# Patient Record
Sex: FEMALE | Race: WHITE
[De-identification: names, ages, dates, MRNs, and addresses within clinical notes are randomized per-mention and may not be internally consistent; named-entity substitution may affect disease eponyms.]

---

## 2018-01-19 ENCOUNTER — HOSPITAL ENCOUNTER (EMERGENCY)
Dept: HOSPITAL 62 - ER | Age: 23
Discharge: HOME | End: 2018-01-19
Payer: COMMERCIAL

## 2018-01-19 VITALS — SYSTOLIC BLOOD PRESSURE: 113 MMHG | DIASTOLIC BLOOD PRESSURE: 58 MMHG

## 2018-01-19 DIAGNOSIS — Z3A.11: ICD-10-CM

## 2018-01-19 DIAGNOSIS — M54.6: ICD-10-CM

## 2018-01-19 DIAGNOSIS — O99.89: ICD-10-CM

## 2018-01-19 DIAGNOSIS — R31.9: ICD-10-CM

## 2018-01-19 DIAGNOSIS — O23.41: Primary | ICD-10-CM

## 2018-01-19 DIAGNOSIS — R07.1: ICD-10-CM

## 2018-01-19 DIAGNOSIS — M54.5: ICD-10-CM

## 2018-01-19 DIAGNOSIS — O26.891: ICD-10-CM

## 2018-01-19 LAB
APPEARANCE UR: (no result)
APTT PPP: YELLOW S
BILIRUB UR QL STRIP: NEGATIVE
GLUCOSE UR STRIP-MCNC: NEGATIVE MG/DL
KETONES UR STRIP-MCNC: (no result) MG/DL
NITRITE UR QL STRIP: NEGATIVE
PH UR STRIP: 5 [PH] (ref 5–9)
PROT UR STRIP-MCNC: NEGATIVE MG/DL
SP GR UR STRIP: 1.03
UROBILINOGEN UR-MCNC: 2 MG/DL (ref ?–2)

## 2018-01-19 PROCEDURE — 81001 URINALYSIS AUTO W/SCOPE: CPT

## 2018-01-19 PROCEDURE — 99283 EMERGENCY DEPT VISIT LOW MDM: CPT

## 2018-01-19 PROCEDURE — 87086 URINE CULTURE/COLONY COUNT: CPT

## 2018-01-19 NOTE — ER DOCUMENT REPORT
ED Neck/Back Problem





- General


Chief Complaint: Back Pain


Stated Complaint: BACK PAIN


Time Seen by Provider: 01/19/18 20:34


Notes: 





22-year-old female patient 11 weeks pregnant complaining of diffuse back pain.  

Pain is located in her thoracic and lumbar spine.  Paraspinally.  Denies any 

trauma.  Denies any fever, chills, sweats.  States that the pain in the middle 

of her back sometimes radiates up into her touch has wall at the back.  Denies 

any shortness of breath.  Denies any leg pain.  No prior history of blood 

clots.  Does not smoke.  No family history of DVT or PE.





- HPI


Patient complains to provider of: Pain


Onset: Last week


Timing: Waxing and waning





- Related Data


Allergies/Adverse Reactions: 


 





No Known Allergies Allergy (Unverified 01/19/18 19:07)


 











Past Medical History





- General


Information source: Patient





- Social History


Smoking Status: Never Smoker


Cigarette use (# per day): No


Frequency of alcohol use: None


Drug Abuse: None


Lives with: Spouse/Significant other


Family History: None





- Past Medical History


Cardiac Medical History: Reports: None


Pulmonary Medical History: Reports: None


Neurological Medical History: Reports: None


Endocrine Medical History: Reports: None


Renal/ Medical History: Reports: None


Malignancy Medical History: Reports: None


GI Medical History: Reports: None


Musculoskeltal Medical History: Reports None





Review of Systems





- Review of Systems


Constitutional: denies: Fever, Malaise, Weakness


Cardiovascular: denies: Chest pain, Palpitations, Heart racing, Dyspnea, Syncope

, Dizziness


Respiratory: Hurts to breathe.  denies: Cough, Short of breath, Wheezing


Gastrointestinal: denies: Abdominal pain, Diarrhea, Nausea, Vomiting


Genitourinary: denies: Burning, Dysuria, Discharge, Flank pain, Hematuria


Female Genitourinary: Pregnant.  denies: Vaginal discharge, Vaginal bleeding


Musculoskeletal: Back pain, Muscle pain, Muscle stiffness


Skin: No symptoms reported


Neurological/Psychological: No symptoms reported





Physical Exam





- Vital signs


Vitals: 


 











Temp Pulse Resp BP Pulse Ox


 


 98.3 F   90   18   119/65   100 


 


 01/19/18 19:43  01/19/18 19:43  01/19/18 19:43  01/19/18 19:43  01/19/18 19:43











Interpretation: Normal





- General


General appearance: Appears well, Alert





- HEENT


Head: Normocephalic, Atraumatic


Eyes: Normal


Pupils: PERRL





- Respiratory


Respiratory status: No respiratory distress


Chest status: Nontender


Breath sounds: Normal


Chest palpation: Normal





- Cardiovascular


Rhythm: Regular


Heart sounds: Normal auscultation


Murmur: No





- Abdominal


Inspection: Normal


Distension: No distension


Bowel sounds: Normal


Tenderness: Nontender


Organomegaly: No organomegaly





- Back


Back: Normal, Tender.  No: Vertebra tenderness


Notes: 





She does have some mild paraspinal tenderness mid thoracic spine all the way 

down to the lumbar paraspinal muscles bilaterally.  No point tenderness of the 

vertebral processes.  No obvious step-offs.





- Extremities


General upper extremity: Normal inspection, Nontender, Normal color, Normal ROM

, Normal temperature


General lower extremity: Normal inspection, Nontender, Normal color, Normal ROM

, Normal temperature, Normal weight bearing.  No: Moisés's sign





- Neurological


Neuro grossly intact: Yes


Cognition: Normal


Orientation: AAOx4


Jenna Coma Scale Eye Opening: Spontaneous


[place] Coma Scale Verbal: Oriented


Jenna Coma Scale Motor: Obeys Commands


[place] Coma Scale Total: 15


Speech: Normal


Motor strength normal: LUE, RUE, LLE, RLE


Sensory: Normal





- Psychological


Associated symptoms: Normal affect, Normal mood





- Skin


Skin Temperature: Warm


Skin Moisture: Dry


Skin Color: Normal





Course





- Re-evaluation


Re-evalutation: 





01/19/18 20:38


Is a well-appearing 22-year-old female with no past medical history who is 11 

weeks pregnant.  Complaining of some back pain.  Advised patient that due to 

the fact that she is in her first trimester she should avoid taking any thing 

other than Tylenol.  Do not feel compelled at this time to order any x-rays 

which would expose the fetus to radiation.  Explained this to the patient that 

I do not feel patient needs x-rays at this time and she seems to comprehend 

this logic.  Will give her some Tylenol, check her urine.  Advised her to follow

-up with her regular doctor.


01/19/18 21:07


Urinalysis shows possible UTI.  In the setting of pregnancy will elect to treat 

at this time with Macrobid.  Patient advised to take all the medications as 

prescribed.





- Vital Signs


Vital signs: 


 











Temp Pulse Resp BP Pulse Ox


 


 98.3 F   90   18   119/65   100 


 


 01/19/18 19:43  01/19/18 19:43  01/19/18 19:43  01/19/18 19:43  01/19/18 19:43














- Laboratory


Laboratory results interpreted by me: 


 











  01/19/18





  20:34


 


Urine Ketones  TRACE H


 


Urine Urobilinogen  2.0 H


 


Ur Leukocyte Esterase  MODERATE H


 


Urine Ascorbic Acid  40 H














Discharge





- Discharge


Clinical Impression: 


Back pain


Qualifiers:


 Back pain location: low back pain Chronicity: acute Back pain laterality: 

bilateral Sciatica presence: without sciatica Qualified Code(s): M54.5 - Low 

back pain





Urinary tract infection


Qualifiers:


 Urinary tract infection type: site unspecified Hematuria presence: without 

hematuria Qualified Code(s): N39.0 - Urinary tract infection, site not specified





Condition: Good


Disposition: HOME, SELF-CARE


Instructions:  Low Back Pain ([facility]), Urinary Tract Infection ([facility]), 

Nitrofurantoin ([facility])


Prescriptions: 


Nitrofurantoin/Nitrofuran Mac [Macrobid 100 mg Capsule] 1 tab PO BID #20 capsule


Forms:  Return to Work

## 2018-03-16 ENCOUNTER — HOSPITAL ENCOUNTER (EMERGENCY)
Dept: HOSPITAL 62 - ER | Age: 23
LOS: 1 days | Discharge: HOME | End: 2018-03-17
Payer: COMMERCIAL

## 2018-03-16 DIAGNOSIS — O46.92: Primary | ICD-10-CM

## 2018-03-16 DIAGNOSIS — Z3A.18: ICD-10-CM

## 2018-03-16 PROCEDURE — 99283 EMERGENCY DEPT VISIT LOW MDM: CPT

## 2018-03-16 NOTE — ER DOCUMENT REPORT
ED General





- General


Chief Complaint: Vaginal Bleeding


Stated Complaint: VAGINAL BLEEDING


Time Seen by Provider: 03/16/18 23:40


Notes: 


Patient is a 22-year-old female presents with complaint of some vaginal 

spotting and pregnancy.  She is approximately 2 weeks pregnant.  This is her 

first pregnancy.  She is on prenatal vitamins.  She does not smoke or drink or 

do drugs.  She says her blood type is A+.  Patient says that she was helping 

her  do some work around the house.  She went to use the bathroom and 

when she wiped she noticed very small amount of blood.  She has had no further 

bleeding.  No abdominal pain.  No abnormal vaginal discharge.  No dysuria.  No 

fevers.  No other complaints at this time.  She has had multiple ultrasounds 

during her pregnancy which have shown an IUP.  [facility].





TRAVEL OUTSIDE OF THE [place] IN LAST 30 DAYS: No





- Related Data


Allergies/Adverse Reactions: 


 





No Known Allergies Allergy (Unverified 01/19/18 19:07)


 











Past Medical History





- Social History


Smoking Status: Never Smoker


Frequency of alcohol use: None


Drug Abuse: None


Family History: None


Renal/ Medical History: Denies: Hx Peritoneal Dialysis





Review of Systems





- Review of Systems


Notes: 





My Normal Review Basic





REVIEW OF SYSTEMS:


CONSTITUTIONAL :  Denies fever,  chills, or sweats.  Denies recent illness.


RESPIRATORY:  Denies cough, cold, or chest congestion.  Denies shortness of 

breath, difficulty breathing, or wheezing.


GASTROINTESTINAL:  Denies abdominal pain.  Denies nausea, vomiting, or 

diarrhea.  Denies constipation.  Last BM: 


GENITOURINARY:  Denies difficulty urinating, painful urination, burning, 

frequency, or blood in urine.


FEMALE  GENITOURINARY: Some vaginal spotting.  LMP: 18 weeks pregnant


MUSCULOSKELETAL:  Denies neck or back pain or joint pain or swelling.


SKIN:   Denies rash or skin lesions.


NEUROLOGICAL:  Denies altered mental status or loss of consciousness.  Denies 

headache.  Denies weakness or paralysis or loss of use of either side.  Denies 

problems with gait or speech.  Denies sensory or motor loss.


ALL OTHER SYSTEMS REVIEWED AND NEGATIVE.





Physical Exam





- Vital signs


Vitals: 


 











Temp Pulse Resp BP Pulse Ox


 


 98.4 F   99   16   115/63   99 


 


 03/16/18 21:23  03/16/18 21:23  03/16/18 21:23  03/16/18 21:23  03/16/18 21:23














- Notes


Notes: 





General Appearance: Well nourished, alert, cooperative, no acute distress, no 

obvious discomfort.  Well-appearing.


Vitals: reviewed, See vital signs table.


Eyes: PERRL, EOMI, Conjuctiva clearmegaly


Lungs: No wheezing, No rales, No rhonci, No accessory muscle use, good air 

exchange bilaterally.


Heart: Normal rate, Regular rythm, No murmur, no rub


Abdomen: Normal BS, soft, No rigidity, No abdominal tenderness, No guarding, no 

rebound, bedside ultrasound shows intrauterine pregnancy with a heart rate of 

164 bpm.  Good fetal movement.


Extremities: strength 5/5 in all extremities, good pulses in all extremities, 

no swelling or tenderness in the extremities, no edema.


Skin: warm, dry, appropriate color, no rash


Neuro: speech clear, oriented x 3, normal affect, responds appropriately to 

questions.





Course





- Re-evaluation


Re-evalutation: 





03/16/18 23:58


Patient looks well.  Bedside ultrasound shows intrauterine pregnancy with good 

fetal movement.  She is 18 weeks pregnant by date.  I do not feel any further 

workup is needed at this time.  Her blood type is a positive.  I informed her 

to avoid heavy lifting or sexual activity until cleared by her OB doctor.  I 

encourage her call OB doctor Monday.  I encouraged her return to ER immediately 

if she has recurrent bleeding, abdominal pain, fevers, or feels unwell.  

Patient agrees with plan will be discharged home.





Dictation of this chart was performed using voice recognition software; 

therefore, there may be some unintended grammatical errors.





- Vital Signs


Vital signs: 


 











Temp Pulse Resp BP Pulse Ox


 


 98.4 F   99   16   115/63   99 


 


 03/16/18 21:23  03/16/18 21:23  03/16/18 21:23  03/16/18 21:23  03/16/18 21:23














Discharge





- Discharge


Clinical Impression: 


 Vaginal bleeding in pregnancy





Condition: Good


Disposition: HOME, SELF-CARE


Additional Instructions: 


Please avoid heavy lifting, sexual activities, or exertional activities until 

cleared by you OB doctor. please follow up with your OB doctor on Monday. 

please return to the ER immediately if you develop increase in bleeding, 

abdominal pain, or feel unwell.

## 2018-03-17 VITALS — DIASTOLIC BLOOD PRESSURE: 48 MMHG | SYSTOLIC BLOOD PRESSURE: 110 MMHG

## 2018-05-30 ENCOUNTER — HOSPITAL ENCOUNTER (OUTPATIENT)
Dept: HOSPITAL 62 - LC | Age: 23
Discharge: HOME | End: 2018-05-30
Attending: OBSTETRICS & GYNECOLOGY
Payer: COMMERCIAL

## 2018-05-30 DIAGNOSIS — E86.0: ICD-10-CM

## 2018-05-30 DIAGNOSIS — Z3A.29: ICD-10-CM

## 2018-05-30 DIAGNOSIS — O26.893: Primary | ICD-10-CM

## 2018-05-30 LAB
APPEARANCE UR: (no result)
APTT PPP: YELLOW S
BARBITURATES UR QL SCN: NEGATIVE
BILIRUB UR QL STRIP: NEGATIVE
GLUCOSE UR STRIP-MCNC: NEGATIVE MG/DL
KETONES UR STRIP-MCNC: NEGATIVE MG/DL
METHADONE UR QL SCN: NEGATIVE
NITRITE UR QL STRIP: NEGATIVE
PCP UR QL SCN: NEGATIVE
PH UR STRIP: 5 [PH] (ref 5–9)
PROT UR STRIP-MCNC: NEGATIVE MG/DL
SP GR UR STRIP: 1.02
URINE AMPHETAMINES SCREEN: NEGATIVE
URINE BENZODIAZEPINES SCREEN: NEGATIVE
URINE COCAINE SCREEN: NEGATIVE
URINE MARIJUANA (THC) SCREEN: NEGATIVE
UROBILINOGEN UR-MCNC: 2 MG/DL (ref ?–2)

## 2018-05-30 PROCEDURE — 4A1HXCZ MONITORING OF PRODUCTS OF CONCEPTION, CARDIAC RATE, EXTERNAL APPROACH: ICD-10-PCS | Performed by: OBSTETRICS & GYNECOLOGY

## 2018-05-30 PROCEDURE — 81001 URINALYSIS AUTO W/SCOPE: CPT

## 2018-05-30 PROCEDURE — 80307 DRUG TEST PRSMV CHEM ANLYZR: CPT

## 2018-06-15 ENCOUNTER — HOSPITAL ENCOUNTER (OUTPATIENT)
Dept: HOSPITAL 62 - LC | Age: 23
Discharge: HOME | End: 2018-06-15
Attending: OBSTETRICS & GYNECOLOGY
Payer: COMMERCIAL

## 2018-06-15 DIAGNOSIS — Z3A.31: ICD-10-CM

## 2018-06-15 DIAGNOSIS — O47.03: Primary | ICD-10-CM

## 2018-06-15 LAB
APPEARANCE UR: (no result)
APTT PPP: YELLOW S
BARBITURATES UR QL SCN: NEGATIVE
BILIRUB UR QL STRIP: NEGATIVE
GLUCOSE UR STRIP-MCNC: NEGATIVE MG/DL
KETONES UR STRIP-MCNC: NEGATIVE MG/DL
METHADONE UR QL SCN: NEGATIVE
NITRITE UR QL STRIP: NEGATIVE
PCP UR QL SCN: NEGATIVE
PH UR STRIP: 7 [PH] (ref 5–9)
PROT UR STRIP-MCNC: NEGATIVE MG/DL
SP GR UR STRIP: 1.01
URINE AMPHETAMINES SCREEN: NEGATIVE
URINE BENZODIAZEPINES SCREEN: NEGATIVE
URINE COCAINE SCREEN: NEGATIVE
URINE MARIJUANA (THC) SCREEN: NEGATIVE
UROBILINOGEN UR-MCNC: NEGATIVE MG/DL (ref ?–2)

## 2018-06-15 PROCEDURE — 81001 URINALYSIS AUTO W/SCOPE: CPT

## 2018-06-15 PROCEDURE — 80307 DRUG TEST PRSMV CHEM ANLYZR: CPT

## 2018-06-15 PROCEDURE — 4A1HXCZ MONITORING OF PRODUCTS OF CONCEPTION, CARDIAC RATE, EXTERNAL APPROACH: ICD-10-PCS | Performed by: OBSTETRICS & GYNECOLOGY

## 2018-07-16 ENCOUNTER — HOSPITAL ENCOUNTER (INPATIENT)
Dept: HOSPITAL 62 - LC | Age: 23
LOS: 2 days | Discharge: HOME | End: 2018-07-18
Attending: OBSTETRICS & GYNECOLOGY | Admitting: OBSTETRICS & GYNECOLOGY
Payer: COMMERCIAL

## 2018-07-16 DIAGNOSIS — Q79.8: ICD-10-CM

## 2018-07-16 DIAGNOSIS — Z91.040: ICD-10-CM

## 2018-07-16 DIAGNOSIS — Z23: ICD-10-CM

## 2018-07-16 DIAGNOSIS — D64.9: ICD-10-CM

## 2018-07-16 DIAGNOSIS — Z3A.36: ICD-10-CM

## 2018-07-16 LAB
A1 MICROGLOB PLACENTAL VAG QL: POSITIVE
ADD MANUAL DIFF: NO
APPEARANCE UR: (no result)
APTT PPP: YELLOW S
BARBITURATES UR QL SCN: NEGATIVE
BASOPHILS # BLD AUTO: 0 10^3/UL (ref 0–0.2)
BASOPHILS NFR BLD AUTO: 0.2 % (ref 0–2)
BILIRUB UR QL STRIP: NEGATIVE
EOSINOPHIL # BLD AUTO: 0.1 10^3/UL (ref 0–0.6)
EOSINOPHIL NFR BLD AUTO: 0.6 % (ref 0–6)
ERYTHROCYTE [DISTWIDTH] IN BLOOD BY AUTOMATED COUNT: 13.3 % (ref 11.5–14)
GLUCOSE UR STRIP-MCNC: NEGATIVE MG/DL
HCT VFR BLD CALC: 35 % (ref 36–47)
HGB BLD-MCNC: 12.5 G/DL (ref 12–15.5)
KETONES UR STRIP-MCNC: 20 MG/DL
LYMPHOCYTES # BLD AUTO: 2.3 10^3/UL (ref 0.5–4.7)
LYMPHOCYTES NFR BLD AUTO: 24.1 % (ref 13–45)
MCH RBC QN AUTO: 31.8 PG (ref 27–33.4)
MCHC RBC AUTO-ENTMCNC: 35.8 G/DL (ref 32–36)
MCV RBC AUTO: 89 FL (ref 80–97)
METHADONE UR QL SCN: NEGATIVE
MONOCYTES # BLD AUTO: 0.6 10^3/UL (ref 0.1–1.4)
MONOCYTES NFR BLD AUTO: 6.5 % (ref 3–13)
NEUTROPHILS # BLD AUTO: 6.6 10^3/UL (ref 1.7–8.2)
NEUTS SEG NFR BLD AUTO: 68.6 % (ref 42–78)
NITRITE UR QL STRIP: NEGATIVE
PCP UR QL SCN: NEGATIVE
PH UR STRIP: 6 [PH] (ref 5–9)
PLATELET # BLD: 174 10^3/UL (ref 150–450)
PROT UR STRIP-MCNC: 100 MG/DL
RBC # BLD AUTO: 3.95 10^6/UL (ref 3.72–5.28)
SP GR UR STRIP: 1.01
TOTAL CELLS COUNTED % (AUTO): 100 %
URINE AMPHETAMINES SCREEN: NEGATIVE
URINE BENZODIAZEPINES SCREEN: NEGATIVE
URINE COCAINE SCREEN: NEGATIVE
URINE MARIJUANA (THC) SCREEN: NEGATIVE
UROBILINOGEN UR-MCNC: NEGATIVE MG/DL (ref ?–2)
WBC # BLD AUTO: 9.7 10^3/UL (ref 4–10.5)

## 2018-07-16 PROCEDURE — 86592 SYPHILIS TEST NON-TREP QUAL: CPT

## 2018-07-16 PROCEDURE — 94760 N-INVAS EAR/PLS OXIMETRY 1: CPT

## 2018-07-16 PROCEDURE — 86900 BLOOD TYPING SEROLOGIC ABO: CPT

## 2018-07-16 PROCEDURE — 90715 TDAP VACCINE 7 YRS/> IM: CPT

## 2018-07-16 PROCEDURE — 86901 BLOOD TYPING SEROLOGIC RH(D): CPT

## 2018-07-16 PROCEDURE — 94799 UNLISTED PULMONARY SVC/PX: CPT

## 2018-07-16 PROCEDURE — 36415 COLL VENOUS BLD VENIPUNCTURE: CPT

## 2018-07-16 PROCEDURE — 84112 EVAL AMNIOTIC FLUID PROTEIN: CPT

## 2018-07-16 PROCEDURE — 81001 URINALYSIS AUTO W/SCOPE: CPT

## 2018-07-16 PROCEDURE — 3E0234Z INTRODUCTION OF SERUM, TOXOID AND VACCINE INTO MUSCLE, PERCUTANEOUS APPROACH: ICD-10-PCS | Performed by: OBSTETRICS & GYNECOLOGY

## 2018-07-16 PROCEDURE — 4A1HXCZ MONITORING OF PRODUCTS OF CONCEPTION, CARDIAC RATE, EXTERNAL APPROACH: ICD-10-PCS | Performed by: OBSTETRICS & GYNECOLOGY

## 2018-07-16 PROCEDURE — 85027 COMPLETE CBC AUTOMATED: CPT

## 2018-07-16 PROCEDURE — 80307 DRUG TEST PRSMV CHEM ANLYZR: CPT

## 2018-07-16 PROCEDURE — 86850 RBC ANTIBODY SCREEN: CPT

## 2018-07-16 PROCEDURE — 85025 COMPLETE CBC W/AUTO DIFF WBC: CPT

## 2018-07-16 PROCEDURE — 76815 OB US LIMITED FETUS(S): CPT

## 2018-07-16 RX ADMIN — SODIUM CHLORIDE, SODIUM LACTATE, POTASSIUM CHLORIDE, AND CALCIUM CHLORIDE PRN ML: .6; .31; .03; .02 INJECTION, SOLUTION INTRAVENOUS at 03:16

## 2018-07-16 RX ADMIN — SODIUM CHLORIDE, SODIUM LACTATE, POTASSIUM CHLORIDE, AND CALCIUM CHLORIDE PRN ML: .6; .31; .03; .02 INJECTION, SOLUTION INTRAVENOUS at 03:17

## 2018-07-16 RX ADMIN — KETOROLAC TROMETHAMINE SCH MG: 30 INJECTION, SOLUTION INTRAMUSCULAR at 11:14

## 2018-07-16 RX ADMIN — Medication SCH CAP: at 11:14

## 2018-07-16 RX ADMIN — DOCUSATE SODIUM SCH MG: 100 CAPSULE, LIQUID FILLED ORAL at 18:50

## 2018-07-16 RX ADMIN — DOCUSATE SODIUM SCH MG: 100 CAPSULE, LIQUID FILLED ORAL at 11:14

## 2018-07-16 RX ADMIN — PENICILLIN G POTASSIUM SCH UNIT: 5000000 POWDER, FOR SOLUTION INTRAMUSCULAR; INTRAPLEURAL; INTRATHECAL; INTRAVENOUS at 11:14

## 2018-07-16 RX ADMIN — PENICILLIN G POTASSIUM SCH UNIT: 5000000 POWDER, FOR SOLUTION INTRAMUSCULAR; INTRAPLEURAL; INTRATHECAL; INTRAVENOUS at 19:15

## 2018-07-16 RX ADMIN — KETOROLAC TROMETHAMINE SCH MG: 30 INJECTION, SOLUTION INTRAMUSCULAR at 18:51

## 2018-07-16 RX ADMIN — OXYCODONE AND ACETAMINOPHEN PRN TAB: 5; 325 TABLET ORAL at 21:40

## 2018-07-16 RX ADMIN — PENICILLIN G POTASSIUM SCH UNIT: 5000000 POWDER, FOR SOLUTION INTRAMUSCULAR; INTRAPLEURAL; INTRATHECAL; INTRAVENOUS at 15:04

## 2018-07-16 RX ADMIN — SODIUM CHLORIDE, SODIUM LACTATE, POTASSIUM CHLORIDE, AND CALCIUM CHLORIDE PRN ML: .6; .31; .03; .02 INJECTION, SOLUTION INTRAVENOUS at 18:16

## 2018-07-16 RX ADMIN — PENICILLIN G POTASSIUM SCH UNIT: 5000000 POWDER, FOR SOLUTION INTRAMUSCULAR; INTRAPLEURAL; INTRATHECAL; INTRAVENOUS at 23:48

## 2018-07-16 RX ADMIN — PENICILLIN G POTASSIUM SCH UNIT: 5000000 POWDER, FOR SOLUTION INTRAMUSCULAR; INTRAPLEURAL; INTRATHECAL; INTRAVENOUS at 14:59

## 2018-07-16 NOTE — PDOC DELIVERY SUMMARY
Delivery Summary





- Maternal


Prenatal Risk Factors: Other


Ruptured Membranes: AROM


Fluids: Clear





- Delivery


Labor: Not In Labor


Presentation: Breech


Uterine Contraction Monitoring: External


Support Person Present: Yes


: Primary


Placenta: Within Normal Limits


Nuchal Cord: No





- Medications


Type of Anesthesia:: Spinal

## 2018-07-16 NOTE — OPERATIVE REPORT E
Operative Report



NAME: GABINO MARTE

MRN:  [id number]          : 1995 AGE:  22Y

DATE OF SURGERY: 2018                        ROOM: 



PREOPERATIVE DIAGNOSIS:

IUP at 36 weeks with breech presentation and premature rupture of

membranes.



POSTOPERATIVE DIAGNOSIS:

IUP at 36 weeks with breech presentation and premature rupture of

membranes.



PROCEDURE:

Primary low transverse  and breech extraction of a viable female,

9 and 9 Apgars, weighing 6 pounds 6 ounces.



SURGEON:

BATSHEVA BRAVO M.D.



ESTIMATED BLOOD LOSS:

Less than 1000 mL.



TISSUE REMOVED OR ALTERED:

Placenta.



ANESTHESIA:

Spinal.



DESCRIPTION OF PROCEDURE:

The patient was placed in a supine position, rolled on her right side,

prepped and draped in a sterile fashion.  A Pfannenstiel incision was made

extending through the subcutaneous tissue and fascia with sharp

dissection.  Fascia sharply divided.  Rectus muscle was bluntly and

sharply divided.  Parietal peritoneum was entered with sharp dissection. 

Uterus nicked in the midline, extended bilaterally, and the incision was

made through the anterior placenta.  The infant was then delivered via

breech extraction.  Nose and mouth suctioned with bulb syringe.  Cord was

clamped.  Infant was passed from the table.  Placenta was manually

extracted.  Uterus closed in 2 layers, the first with a running stitch of

0 Vicryl and a second Lembert stitch imbricating the first layer.  There

was a small amount of bleeding noted at one portion, which was controlled

with figure of eight suture of 0 Vicryl.  Hemostasis was noted.  Fascia

closed with #1 Vicryl and the skin was closed with subcu absorbable

staples.  The patient's urine remained clear throughout the procedure. 

She was taken to recovery in good condition and the infant was taken to

the  nursery in good condition.



DICTATING PHYSICIAN:  BATSHEVA BRAVO M.D.





[id number]                  DT: 2018    [id number]

PHY#: [id number]            DD: 2018    0749

ID:   [id number]           JOB#: [id number]       ACCT: [id number]



cc:BATSHEVA BRAVO M.D.

>

## 2018-07-16 NOTE — ADMISSION PHYSICAL
=================================================================



=================================================================

Datetime Report Generated by CPN: 2018 06:39

   

   

=================================================================

CURRENT ADMISSION

=================================================================

   

Chief Complaint:  Suspected Ruptured Membranes

Indication for Induction:  Not Applicable

Admit Impression :  , Intrauterine Pregnancy

Admit Plan:  Initiate  Section Protocol

   

=================================================================

ALLERGIES

=================================================================

   

Medication Allergies:  No

Medication Allergies:  Latex, Natural Rubber (2018)

Latex:  Latex Allergies

Food Allergies:  none

Environmental Allergies:  none

   

=================================================================

OBSTETRICAL HISTORY

=================================================================

   

EDC:  2018 00:00

:  1

Para:  0

Term:  0

:  0

SAB:  0

IAB:  0

Ectopic:  0

Livin

Cesareans:  0

VBACs:  0

Multiple Births:  0

Gestational Diabetes:  No

Rh Sensitization:  No

Incompetent Cervix:  No

BINA:  No

Infertility:  No

ART Treatment:  No

Uterine Anomaly:  No

IUGR:  No

Hx Previous C/S:  No

Macrosomia:  No

Hx Loss/Stillborn:  No

PIH:  No

Hx  Death:  No

Placenta Previa/Abruption:  No

Depression/PP Depression:  No

PTL/PROM:  Yes

Post Partum Hemorrhage:  No

Current Pregnancy Procedures:  Ultrasound

Obstetrical History Comments:  G1: Current, SROM @36.1

   

=================================================================

***SEE PRENATAL RECORDS***

=================================================================

   

Alcohol:  No

Marijuana :  No

Cocaine:  No

Other Illicit Drugs:  No

Cigarettes:  Never Smoker. [id number]

   

=================================================================

MEDICAL HISTORY

=================================================================

   

Diabetes:  No

Blood Transfusion:  No

Pulmonary Disease (Asthma, TB):  No

Breast Disease:  No

Hypertension:  No

Gyn Surgery:  No

Heart Disease:  No

Hosp/Surgery:  Yes

Autoimmune Disorder:  No

Anesthetic Complications:  No

Kidney Disease:  No

Abnormal Pap Smear:  No

Neuro/Epilepsy:  No

Psychiatric Disorders:  No

Other Medical Diseases:  No

Hepatitis/Liver Disease:  No

Significant Family History:  No

Varicosities/Phlebitis:  No

Trauma/Violence :  No

Thyroid Dysfunction:  No

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

   child, anemia, dyslexia

   

=================================================================

INFECTIOUS HISTORY

=================================================================

   

Gonorrhea:  No

Genital Herpes:  No

Chlamydia:  No

Tuberculosis:  No

Syphilis:  No

Hepatitis:  No

HIV/AIDS Exposure:  No

Rash or Viral Illness:  No

HPV:  No

   

=================================================================

PHYSICAL EXAM

=================================================================

   

General:  Normal

HEENT:  Normal

Neurologic:  Normal

Thyroid:  Normal

Heart:  Normal

Lungs:  Normal

Breast:  Deferred

Back:  Normal

Abdomen:  Normal

Genitourinary Exam:  Normal

Extremities:  Normal

DTRs:  Normal

Pelvic Type:  Adequate

   

=================================================================

FETUS A

=================================================================

   

EGA:  36.1

Monitoring:  External US

Decelerations:  None

Admit Comment:  breech procede with 

   

=================================================================

PLANS FOR LABOR AND DELIVERY

=================================================================

   

Labor and Delivery:  None

Pain Management:  Epidural

Feeding Preference:  Both

Benefit of Breast Feed Discussed:  Yes

Circumcision:  N/A

   

=================================================================

INFORMED CONSENT

=================================================================

   

Signature:  Electronically signed by Bridger Marx MD ([employer]) on

   2018 at 06:38  with User ID: [email or web address]

## 2018-07-16 NOTE — RADIOLOGY REPORT (SQ)
EXAM DESCRIPTION: 



US PREGNANCY LIMITED  



COMPLETED DATE/TME:  07/16/2018 00:00



CLINICAL HISTORY: 



22 years, Female, srom unable to determine fetal positon



COMPARISON:

None.



TECHNIQUE:

Limited obstetrical ultrasound.





FINDINGS:



MARJORIE: 7.7 cm.



Fetal heart rate of 147 bpm.



Cervical length: Not provided.



Placenta: Anterior. Scattered cystic structures identified in the

placenta, largest measuring 3.0 cm. No definite retroplacental

fluid collection identified.



Fetal presentation: Breech



IMPRESSION:





1. Single intrauterine pregnancy with heart rate of 147 bpm. 



2. Multiple heterogeneous foci within the placenta with

hyperechoic borders and central cystic areas, the largest

measuring 3.0 cm.

 



 2011 [employer]- All Rights Reserved

## 2018-07-17 LAB
ERYTHROCYTE [DISTWIDTH] IN BLOOD BY AUTOMATED COUNT: 13.4 % (ref 11.5–14)
HCT VFR BLD CALC: 31.8 % (ref 36–47)
HGB BLD-MCNC: 11.4 G/DL (ref 12–15.5)
MCH RBC QN AUTO: 32.2 PG (ref 27–33.4)
MCHC RBC AUTO-ENTMCNC: 35.8 G/DL (ref 32–36)
MCV RBC AUTO: 90 FL (ref 80–97)
PLATELET # BLD: 159 10^3/UL (ref 150–450)
RBC # BLD AUTO: 3.55 10^6/UL (ref 3.72–5.28)
WBC # BLD AUTO: 10.4 10^3/UL (ref 4–10.5)

## 2018-07-17 RX ADMIN — DOCUSATE SODIUM SCH MG: 100 CAPSULE, LIQUID FILLED ORAL at 09:53

## 2018-07-17 RX ADMIN — KETOROLAC TROMETHAMINE SCH MG: 30 INJECTION, SOLUTION INTRAMUSCULAR at 09:52

## 2018-07-17 RX ADMIN — KETOROLAC TROMETHAMINE SCH MG: 30 INJECTION, SOLUTION INTRAMUSCULAR at 18:16

## 2018-07-17 RX ADMIN — DOCUSATE SODIUM SCH MG: 100 CAPSULE, LIQUID FILLED ORAL at 18:16

## 2018-07-17 RX ADMIN — Medication SCH CAP: at 09:52

## 2018-07-17 RX ADMIN — OXYCODONE AND ACETAMINOPHEN PRN TAB: 5; 325 TABLET ORAL at 19:17

## 2018-07-17 RX ADMIN — KETOROLAC TROMETHAMINE SCH MG: 30 INJECTION, SOLUTION INTRAMUSCULAR at 02:21

## 2018-07-17 NOTE — PDOC PROGRESS REPORT
Subjective-OB


Progress Note for:: 18





Physical Exam (OB)


Vital Signs: 


 











Temp Pulse Resp BP Pulse Ox


 


 98.0 F   81   14   109/59 L  96 


 


 18 07:52  18 07:52  18 07:52  18 07:52  18 07:52








 Intake & Output











 18





 06:59 06:59 06:59


 


Intake Total  1250 


 


Output Total  3700 


 


Balance  -2450 


 


Weight 76.7 kg  














- General


General Appearance: Sleeping/easily aroused





- 


Dressing Removed: No - silktape clean dry and intact


Incision: Dressing





- Lochia


Lochia Amount: Small 10-25 ml


Lochia Color: Rubra/Red





- Abdomen


Description: Soft, Round


Hernia Present: No


Bowel Sounds: Normoactive


Flatus Presence: Absent


Stool: No


Fundal Description: Firm, Midline


Fundal Height: u/u - u/2





Objective-Diagnostic


Laboratory: 


 





 18 05:52 





 











  18





  05:52


 


WBC  10.4


 


RBC  3.55 L


 


Hgb  11.4 L


 


Hct  31.8 L


 


MCV  90


 


MCH  32.2


 


MCHC  35.8


 


RDW  13.4


 


Plt Count  159

## 2018-07-18 VITALS — DIASTOLIC BLOOD PRESSURE: 65 MMHG | SYSTOLIC BLOOD PRESSURE: 117 MMHG

## 2018-07-18 RX ADMIN — IBUPROFEN SCH MG: 800 TABLET, FILM COATED ORAL at 01:06

## 2018-07-18 RX ADMIN — IBUPROFEN SCH MG: 800 TABLET, FILM COATED ORAL at 05:48

## 2018-07-18 RX ADMIN — OXYCODONE AND ACETAMINOPHEN PRN TAB: 5; 325 TABLET ORAL at 11:50

## 2018-07-18 RX ADMIN — IBUPROFEN SCH MG: 800 TABLET, FILM COATED ORAL at 11:49

## 2018-07-18 RX ADMIN — DOCUSATE SODIUM SCH MG: 100 CAPSULE, LIQUID FILLED ORAL at 10:07

## 2018-07-18 RX ADMIN — Medication SCH CAP: at 10:07

## 2018-07-18 NOTE — PDOC DISCHARGE SUMMARY
Final Diagnosis


Discharge Date: 18





- Final Diagnosis


(1) Breech presentation


Is this a current diagnosis for this admission?: Yes   





(2) Delivery by emergency caesarean section


Is this a current diagnosis for this admission?: Yes   





(3) Premature rupture of membranes


Is this a current diagnosis for this admission?: Yes   





Discharge Data





- Discharge Medication


Prescriptions: 


Ibuprofen [Motrin 800 mg Tablet] 800 mg PO Q8HP PRN #90 tablet


 PRN Reason: 


Oxycodone HCl/Acetaminophen [Percocet 5-325 mg Tablet] 1 tab PO Q4HP PRN #20 

tablet


 PRN Reason: 


Home Medications: 








Prenatal Vit/Iron Fum/Folic AC [Prenatal Tablet] 1 each PO DAILY 18 


Ibuprofen [Motrin 800 mg Tablet] 800 mg PO Q8HP PRN #90 tablet 18 


Oxycodone HCl/Acetaminophen [Percocet 5-325 mg Tablet] 1 tab PO Q4HP PRN #20 

tablet 18 


Prenatal Vit/Dha [Prenatal Multi + Dha Capsule] 1 cap PO DAILY  capsule  








Reason(s) for Admission: PROM


Prenatal Procedures: NST


Intrapartum Procedure(s): : Low Cervical, Transverse





- Diagnosis Test


Laboratory: 


 











Temp Pulse Resp BP Pulse Ox


 


 98.1 F   85   16   117/65   98 


 


 18 08:39  18 08:39  18 08:39  18 08:39  18 08:39








 











  18





  01:28 02:27 05:52


 


RBC   3.95  3.55 L


 


Hgb   12.5  11.4 L


 


Hct   35.0 L  31.8 L


 


Urine Opiates Screen  NEGATIVE  














- Discharge information/Instructions


Discharge Activity: Balance Activity w/Rest, No Lifting/Push/Pulling, Pelvic 

Rest, No tub bath


Discharge Diet: Regular


Disposition: HOME, SELF-CARE


Follow up with: [facility]


in: 1, Weeks

## 2018-07-19 ENCOUNTER — HOSPITAL ENCOUNTER (EMERGENCY)
Dept: HOSPITAL 62 - ER | Age: 23
LOS: 1 days | Discharge: HOME | End: 2018-07-20
Payer: COMMERCIAL

## 2018-07-19 VITALS — SYSTOLIC BLOOD PRESSURE: 123 MMHG | DIASTOLIC BLOOD PRESSURE: 63 MMHG

## 2018-07-19 DIAGNOSIS — Z91.040: ICD-10-CM

## 2018-07-19 PROCEDURE — 80048 BASIC METABOLIC PNL TOTAL CA: CPT

## 2018-07-19 PROCEDURE — 36415 COLL VENOUS BLD VENIPUNCTURE: CPT

## 2018-07-19 PROCEDURE — 85025 COMPLETE CBC W/AUTO DIFF WBC: CPT

## 2018-07-19 PROCEDURE — 99283 EMERGENCY DEPT VISIT LOW MDM: CPT

## 2018-07-20 LAB
ADD MANUAL DIFF: NO
ANION GAP SERPL CALC-SCNC: 11 MMOL/L (ref 5–19)
BASOPHILS # BLD AUTO: 0 10^3/UL (ref 0–0.2)
BASOPHILS NFR BLD AUTO: 0.5 % (ref 0–2)
BUN SERPL-MCNC: 12 MG/DL (ref 7–20)
CALCIUM: 9.9 MG/DL (ref 8.4–10.2)
CHLORIDE SERPL-SCNC: 102 MMOL/L (ref 98–107)
CO2 SERPL-SCNC: 28 MMOL/L (ref 22–30)
EOSINOPHIL # BLD AUTO: 0.3 10^3/UL (ref 0–0.6)
EOSINOPHIL NFR BLD AUTO: 3.7 % (ref 0–6)
ERYTHROCYTE [DISTWIDTH] IN BLOOD BY AUTOMATED COUNT: 13.4 % (ref 11.5–14)
GLUCOSE SERPL-MCNC: 89 MG/DL (ref 75–110)
HCT VFR BLD CALC: 36.2 % (ref 36–47)
HGB BLD-MCNC: 12.5 G/DL (ref 12–15.5)
LYMPHOCYTES # BLD AUTO: 2.6 10^3/UL (ref 0.5–4.7)
LYMPHOCYTES NFR BLD AUTO: 32.8 % (ref 13–45)
MCH RBC QN AUTO: 31.6 PG (ref 27–33.4)
MCHC RBC AUTO-ENTMCNC: 34.6 G/DL (ref 32–36)
MCV RBC AUTO: 91 FL (ref 80–97)
MONOCYTES # BLD AUTO: 0.4 10^3/UL (ref 0.1–1.4)
MONOCYTES NFR BLD AUTO: 5 % (ref 3–13)
NEUTROPHILS # BLD AUTO: 4.6 10^3/UL (ref 1.7–8.2)
NEUTS SEG NFR BLD AUTO: 58 % (ref 42–78)
PLATELET # BLD: 237 10^3/UL (ref 150–450)
POTASSIUM SERPL-SCNC: 4.1 MMOL/L (ref 3.6–5)
RBC # BLD AUTO: 3.97 10^6/UL (ref 3.72–5.28)
SODIUM SERPL-SCNC: 140.7 MMOL/L (ref 137–145)
TOTAL CELLS COUNTED % (AUTO): 100 %
WBC # BLD AUTO: 7.9 10^3/UL (ref 4–10.5)

## 2018-07-20 NOTE — ER DOCUMENT REPORT
ED General





- General


Chief Complaint: Post Surgical Bleeding


Stated Complaint: POST SURGICAL COMPLAINT


Time Seen by Provider: 18 00:46


Notes: 


Patient is a 22-year-old female who comes to emergency department for chief 

complaint of concerns about her wound from her  on 18 by Dr. Marx

, she states she was going upstairs earlier when she noticed the area was 

bleeding.  She states there was bleeding again and she became concerned and 

came in to get it checked.  She states she has had some vaginal bleeding, she 

thought it was more than usual but this has slowed down since.  She denies any 

dizziness, passing out, fever, abdominal pain, or any other concerning 

development.





TRAVEL OUTSIDE OF THE [place] IN LAST 30 DAYS: No





- Related Data


Allergies/Adverse Reactions: 


 





Latex, Natural Rubber Allergy (Verified 18 01:23)


 











Past Medical History





- General


Information source: Patient





- Social History


Smoking Status: Never Smoker


Chew tobacco use (# tins/day): No


Frequency of alcohol use: None


Drug Abuse: None


Lives with: Family


Family History: None


Patient has suicidal ideation: No


Patient has homicidal ideation: No


Renal/ Medical History: Denies: Hx Peritoneal Dialysis


Past Surgical History: Reports: Hx  Section





- Immunizations


Hx Diphtheria, Pertussis, Tetanus Vaccination: Yes





Review of Systems





- Review of Systems


Constitutional: No symptoms reported


EENT: No symptoms reported


Cardiovascular: No symptoms reported


Respiratory: No symptoms reported


Gastrointestinal: No symptoms reported


Genitourinary: No symptoms reported


Female Genitourinary: See HPI


Musculoskeletal: No symptoms reported


Skin: See HPI


Hematologic/Lymphatic: No symptoms reported


Neurological/Psychological: No symptoms reported





Physical Exam





- Vital signs


Vitals: 


 











Temp Pulse Resp BP Pulse Ox


 


 98.8 F   94   16   123/63   98 


 


 18 22:33  18 22:33  18 22:33  18 22:33  18 22:33














- Notes


Notes: 





GENERAL: Alert, interacts well. No acute distress.


HEAD: Normocephalic, atraumatic.


EYES: Pupils equal, round, and reactive to light. Extraocular movements intact.


ENT: Oral mucosa moist, tongue midline. 


NECK: Full range of motion. Supple. Trachea midline.


LUNGS: Clear to auscultation bilaterally, no wheezes, rales, or rhonchi. No 

respiratory distress.


HEART: Regular rate and rhythm. No murmur


ABDOMEN: Lower abdominal horizontal  scar/healing wound.  Small area 

on the right several centimeters in length has small opening, recent bleeding, 

no surrounding erythema, fluctuance, drainage, or other abnormality noted.  

Nontender abdomen.


EXTREMITIES: Moves all 4 extremities spontaneously. No edema, normal radial and 

dorsalis pedis pulses bilaterally. No cyanosis.


BACK: no cervical, thoracic, lumbar midline tenderness. No saddle anesthesia, 

normal distal neurovascular exam. 


NEUROLOGICAL: Alert and oriented x3. Normal speech. [cranial nerves II through 

XII grossly intact]. 


PSYCH: Normal affect, normal mood.


SKIN: Warm, dry, normal turgor. No rashes or lesions noted.








Course





- Re-evaluation


Re-evalutation: 


CBC and chemistry unremarkable.  Patient well-appearing on examination, soft 

nontender abdomen, wound has 1 small area where it has opened and appeared to 

have had some bleeding earlier but is otherwise unremarkable with no evidence 

of infection.  Vital signs unremarkable.  Patient sitting her bleeding is 

actually not bad and is expected, she is hoping that the wound can be checked/

treated and then she is asking to leave.  Wound was actually closed with Steri-

Strips and a dressing, discussed care of this, follow-up, return precautions 

with patient and mother.  He states satisfaction and agreement.





- Vital Signs


Vital signs: 


 











Temp Pulse Resp BP Pulse Ox


 


 98.8 F   94   16   123/63   98 


 


 18 22:33  18 22:33  18 22:33  18 22:33  18 22:33














- Laboratory


Result Diagrams: 


 18 23:45





 18 23:45





Discharge





- Discharge


Clinical Impression: 


  section wound complication





Condition: Stable


Disposition: HOME, SELF-CARE


Additional Instructions: 


Your laboratory evaluation does not show any concerning abnormality.


The wound is not infected at this time, Steri-Strips have been placed to 

reinforce the wound, keep Steri-Strips on the area, follow-up closely with OB/

GYN for additional evaluation and management.  Return for any concerning 

symptoms including spreading redness of the wound, fever, discolored discharge, 

severe abdominal pain, passing out, or any other concerning or worsening 

symptoms.


_______________





Care of Steri-Strip Closure





     Your cut has been closed up with a special surgical tape.  For this type 

of cut, it can replace stitches.  You must protect the wound just as you would 

with stitches, however.


     For the first few days, keep the wound area completely dry.  This also 

means you should avoid activity which makes you sweat.  Do not move the area if 

motion stretches or wrinkles the strips.  Don't allow the area to be bumped -- 

if bleeding occurs, the blood can make the strips loosen.


     The strips are somewhat waterproof.  After a few days, you can shower 

shower.  


     Do not remove the tape until it peels off by itself.  At that time, the 

wound should be healed.





Referrals: 


LALIT JEAN BAPTISTE MD [Primary Care Provider] - Follow up as needed